# Patient Record
Sex: FEMALE | Race: WHITE | NOT HISPANIC OR LATINO | Employment: OTHER | URBAN - METROPOLITAN AREA
[De-identification: names, ages, dates, MRNs, and addresses within clinical notes are randomized per-mention and may not be internally consistent; named-entity substitution may affect disease eponyms.]

---

## 2019-03-19 ENCOUNTER — OFFICE VISIT (OUTPATIENT)
Dept: PODIATRY | Facility: CLINIC | Age: 79
End: 2019-03-19
Payer: MEDICARE

## 2019-03-19 VITALS — BODY MASS INDEX: 34.96 KG/M2 | WEIGHT: 190 LBS | HEIGHT: 62 IN

## 2019-03-19 DIAGNOSIS — L03.032 PARONYCHIA OF GREAT TOE, LEFT: ICD-10-CM

## 2019-03-19 DIAGNOSIS — L60.0 INGROWN NAIL: Primary | ICD-10-CM

## 2019-03-19 PROCEDURE — 99202 PR OFFICE/OUTPT VISIT, NEW, LEVL II, 15-29 MIN: ICD-10-PCS | Mod: S$PBB,,, | Performed by: PODIATRIST

## 2019-03-19 PROCEDURE — 99202 OFFICE O/P NEW SF 15 MIN: CPT | Mod: PBBFAC,PN | Performed by: PODIATRIST

## 2019-03-19 PROCEDURE — 99999 PR PBB SHADOW E&M-NEW PATIENT-LVL II: CPT | Mod: PBBFAC,,, | Performed by: PODIATRIST

## 2019-03-19 PROCEDURE — 99999 PR PBB SHADOW E&M-NEW PATIENT-LVL II: ICD-10-PCS | Mod: PBBFAC,,, | Performed by: PODIATRIST

## 2019-03-19 PROCEDURE — 99202 OFFICE O/P NEW SF 15 MIN: CPT | Mod: S$PBB,,, | Performed by: PODIATRIST

## 2019-03-19 RX ORDER — RIVAROXABAN 10 MG/1
TABLET, FILM COATED ORAL
COMMUNITY
Start: 2019-01-07

## 2019-03-19 RX ORDER — AMLODIPINE AND BENAZEPRIL HYDROCHLORIDE 5; 40 MG/1; MG/1
CAPSULE ORAL
COMMUNITY
Start: 2019-01-07 | End: 2020-09-01

## 2019-03-19 RX ORDER — PRAVASTATIN SODIUM 20 MG/1
TABLET ORAL
COMMUNITY
Start: 2019-01-04

## 2019-03-19 RX ORDER — LEVOTHYROXINE SODIUM 50 UG/1
TABLET ORAL
COMMUNITY
Start: 2018-12-27

## 2019-03-23 PROBLEM — L03.032 PARONYCHIA OF GREAT TOE, LEFT: Status: ACTIVE | Noted: 2019-03-23

## 2019-03-23 PROBLEM — L60.0 INGROWN NAIL: Status: ACTIVE | Noted: 2019-03-23

## 2019-03-24 NOTE — PROGRESS NOTES
Subjective:       Patient ID: Anna Ramirez is a 78 y.o. female.    Chief Complaint: Foot Problem and Nail Problem    Patient presents today with a complaint of ingrown toenails on both big toes patient states this has been bothering her for about a week her right great toenail is very loose and she is concerned she is going to catch it on something or repeat off she states that she is typically barefoot or wearing sandals she cannot currently wear a closed in shoe.  HPI  Review of Systems   Musculoskeletal: Positive for joint swelling.   All other systems reviewed and are negative.      Objective:      Physical Exam   Constitutional: She appears well-developed and well-nourished.   Cardiovascular:   Pulses:       Dorsalis pedis pulses are 1+ on the right side, and 1+ on the left side.        Posterior tibial pulses are 1+ on the right side, and 1+ on the left side.   Pulmonary/Chest: Effort normal.   Musculoskeletal: She exhibits tenderness.   Feet:   Right Foot:   Protective Sensation: 4 sites tested. 4 sites sensed.   Skin Integrity: Positive for erythema and warmth.   Left Foot:   Protective Sensation: 4 sites tested. 4 sites sensed.   Skin Integrity: Positive for erythema and warmth.   Neurological: She is alert.   Skin: Capillary refill takes 2 to 3 seconds. There is erythema.   Psychiatric: She has a normal mood and affect. Her behavior is normal. Judgment and thought content normal.   Nursing note and vitals reviewed.      Assessment:       1. Ingrown nail    2. Paronychia of great toe, left        Plan:         Following a complete new patient evaluation patient has findings consistent with ingrown toenails on both big toes the patient's right hallux nail is 75% loose is lifting off of the surrounding nail bed there is erythema edema encompassing both big toenails with early signs of infection noted patient does have positive pain upon palpation.  Following examination I was able to aggressively debride  and remove approximately 75% of the patient's right hallux nail patient stated that it felt much better and actually relieve the pressure after removing the nail the ingrowing nail from both the medial lateral borders of the left hallux were also removed offering the patient considerable relief bacitracin ointment a light dressing applied I have recommended application of Vicks vapor rub to the toenails twice a day every day over the next 4-6 months to address the fungal infection which is likely a contributing factor for the bacterial infection that she presented with today.  Patient did not need to be on an antibiotic at this time as the infection appears well controlled following removal of the nail flushing and irrigating the area and applying bacitracin ointment if the patient is not completely pain-free the next 4-5 days she is to contact me for follow-up further evaluation and treatment.  Total face-to-face time including discussion evaluation and treatment equaled 30 min.

## 2020-09-01 ENCOUNTER — OFFICE VISIT (OUTPATIENT)
Dept: PODIATRY | Facility: CLINIC | Age: 80
End: 2020-09-01
Payer: MEDICARE

## 2020-09-01 VITALS
HEIGHT: 62 IN | DIASTOLIC BLOOD PRESSURE: 76 MMHG | HEART RATE: 77 BPM | BODY MASS INDEX: 34.04 KG/M2 | SYSTOLIC BLOOD PRESSURE: 127 MMHG | TEMPERATURE: 99 F | WEIGHT: 185 LBS

## 2020-09-01 DIAGNOSIS — L03.031 PARONYCHIA OF GREAT TOE, RIGHT: ICD-10-CM

## 2020-09-01 DIAGNOSIS — L60.0 INGROWN NAIL: Primary | ICD-10-CM

## 2020-09-01 PROCEDURE — 99999 PR PBB SHADOW E&M-EST. PATIENT-LVL III: CPT | Mod: PBBFAC,,, | Performed by: PODIATRIST

## 2020-09-01 PROCEDURE — 99213 OFFICE O/P EST LOW 20 MIN: CPT | Mod: PBBFAC,PN | Performed by: PODIATRIST

## 2020-09-01 PROCEDURE — 99213 PR OFFICE/OUTPT VISIT, EST, LEVL III, 20-29 MIN: ICD-10-PCS | Mod: S$PBB,,, | Performed by: PODIATRIST

## 2020-09-01 PROCEDURE — 99999 PR PBB SHADOW E&M-EST. PATIENT-LVL III: ICD-10-PCS | Mod: PBBFAC,,, | Performed by: PODIATRIST

## 2020-09-01 PROCEDURE — 99213 OFFICE O/P EST LOW 20 MIN: CPT | Mod: S$PBB,,, | Performed by: PODIATRIST

## 2020-09-01 RX ORDER — ERGOCALCIFEROL 1.25 MG/1
50000 CAPSULE ORAL
COMMUNITY
Start: 2020-08-18

## 2020-09-01 RX ORDER — AMLODIPINE AND VALSARTAN 5; 160 MG/1; MG/1
1 TABLET ORAL DAILY
COMMUNITY
Start: 2020-07-24

## 2020-09-06 PROBLEM — L03.031 PARONYCHIA OF GREAT TOE, RIGHT: Status: ACTIVE | Noted: 2020-09-06

## 2020-09-06 NOTE — PROGRESS NOTES
Subjective:       Patient ID: Anna Ramirez is a 80 y.o. female.    Chief Complaint: Follow-up and Nail Problem    Patient presents today with a complaint of ingrown toenails on both big toes patient states this has been bothering her for about a week her right great toenail is very loose and she is concerned she is going to catch it on something or repeat off she states that she is typically barefoot or wearing sandals she cannot currently wear a closed in shoe.  Follow-up  Associated symptoms include joint swelling.   Nail Problem  Associated symptoms include joint swelling.     Review of Systems   Musculoskeletal: Positive for joint swelling.   All other systems reviewed and are negative.      Objective:      Physical Exam  Vitals signs and nursing note reviewed.   Constitutional:       Appearance: She is well-developed.   Cardiovascular:      Pulses:           Dorsalis pedis pulses are 1+ on the right side and 1+ on the left side.        Posterior tibial pulses are 1+ on the right side and 1+ on the left side.   Pulmonary:      Effort: Pulmonary effort is normal.   Musculoskeletal:         General: Tenderness present.   Feet:      Right foot:      Protective Sensation: 4 sites tested. 4 sites sensed.      Skin integrity: Erythema and warmth present.      Left foot:      Protective Sensation: 4 sites tested. 4 sites sensed.      Skin integrity: Erythema and warmth present.   Skin:     Capillary Refill: Capillary refill takes 2 to 3 seconds.      Findings: Erythema present.   Neurological:      Mental Status: She is alert.   Psychiatric:         Behavior: Behavior normal.         Thought Content: Thought content normal.         Judgment: Judgment normal.                 Assessment:       1. Ingrown nail    2. Paronychia of great toe, right        Plan:         Following a complete new patient evaluation patient has findings consistent with ingrown toenails on both big toes the patient's right hallux nail is 75%  loose is lifting off of the surrounding nail bed there is erythema edema encompassing both big toenails with early signs of infection noted patient does have positive pain upon palpation.  Following examination I was able to aggressively debride and remove approximately 75% of the patient's right hallux nail patient stated that it felt much better and actually relieve the pressure after removing the nail the ingrowing nail from both the medial lateral borders of the left hallux were also removed offering the patient considerable relief bacitracin ointment a light dressing applied I have recommended application of Vicks vapor rub to the toenails twice a day every day over the next 4-6 months to address the fungal infection which is likely a contributing factor for the bacterial infection that she presented with today.  Patient did not need to be on an antibiotic at this time as the infection appears well controlled following removal of the nail flushing and irrigating the area and applying bacitracin ointment if the patient is not completely pain-free the next 4-5 days she is to contact me for follow-up further evaluation and treatment.  Patient had significantly incurvated nail with signs of infection in the medial border of the right hallux this was aggressively debrided relieving the patient's discomfort.  Total face-to-face time including discussion evaluation and treatment equaled 15 min.This note was created using [employer] voice recognition software that occasionally misinterpreted phrases or words.